# Patient Record
Sex: MALE | Race: ASIAN | NOT HISPANIC OR LATINO | Employment: FULL TIME | URBAN - METROPOLITAN AREA
[De-identification: names, ages, dates, MRNs, and addresses within clinical notes are randomized per-mention and may not be internally consistent; named-entity substitution may affect disease eponyms.]

---

## 2017-03-28 ENCOUNTER — TRANSFERRED RECORDS (OUTPATIENT)
Dept: MULTI SPECIALTY CLINIC | Facility: CLINIC | Age: 62
End: 2017-03-28

## 2023-12-07 ENCOUNTER — OFFICE VISIT (OUTPATIENT)
Dept: INTERNAL MEDICINE | Facility: CLINIC | Age: 68
End: 2023-12-07
Payer: COMMERCIAL

## 2023-12-07 VITALS
OXYGEN SATURATION: 95 % | TEMPERATURE: 96.8 F | HEART RATE: 97 BPM | WEIGHT: 211.8 LBS | SYSTOLIC BLOOD PRESSURE: 138 MMHG | DIASTOLIC BLOOD PRESSURE: 80 MMHG

## 2023-12-07 DIAGNOSIS — E11.29 TYPE 2 DIABETES MELLITUS WITH MICROALBUMINURIA, WITHOUT LONG-TERM CURRENT USE OF INSULIN (H): ICD-10-CM

## 2023-12-07 DIAGNOSIS — L83 ACANTHOSIS NIGRICANS: ICD-10-CM

## 2023-12-07 DIAGNOSIS — R80.9 TYPE 2 DIABETES MELLITUS WITH MICROALBUMINURIA, WITHOUT LONG-TERM CURRENT USE OF INSULIN (H): ICD-10-CM

## 2023-12-07 DIAGNOSIS — Z23 ENCOUNTER FOR IMMUNIZATION: ICD-10-CM

## 2023-12-07 DIAGNOSIS — I10 ESSENTIAL HYPERTENSION: ICD-10-CM

## 2023-12-07 DIAGNOSIS — E78.5 HYPERLIPIDEMIA LDL GOAL <100: ICD-10-CM

## 2023-12-07 DIAGNOSIS — M65.30 TRIGGER FINGER, ACQUIRED: ICD-10-CM

## 2023-12-07 DIAGNOSIS — Z00.00 ROUTINE GENERAL MEDICAL EXAMINATION AT A HEALTH CARE FACILITY: Primary | ICD-10-CM

## 2023-12-07 DIAGNOSIS — Z12.5 SCREENING FOR PROSTATE CANCER: ICD-10-CM

## 2023-12-07 DIAGNOSIS — M17.0 PRIMARY OSTEOARTHRITIS OF BOTH KNEES: ICD-10-CM

## 2023-12-07 PROBLEM — E87.6 CHRONIC HYPOKALEMIA: Status: ACTIVE | Noted: 2021-07-11

## 2023-12-07 PROBLEM — L20.82 FLEXURAL ECZEMA: Status: ACTIVE | Noted: 2021-07-11

## 2023-12-07 LAB
ALBUMIN SERPL BCG-MCNC: 4.5 G/DL (ref 3.5–5.2)
ALP SERPL-CCNC: 71 U/L (ref 40–150)
ALT SERPL W P-5'-P-CCNC: 38 U/L (ref 0–70)
ANION GAP SERPL CALCULATED.3IONS-SCNC: 9 MMOL/L (ref 7–15)
AST SERPL W P-5'-P-CCNC: 32 U/L (ref 0–45)
BILIRUB SERPL-MCNC: 0.9 MG/DL
BUN SERPL-MCNC: 14.4 MG/DL (ref 8–23)
CALCIUM SERPL-MCNC: 9.5 MG/DL (ref 8.8–10.2)
CHLORIDE SERPL-SCNC: 101 MMOL/L (ref 98–107)
CHOLEST SERPL-MCNC: 111 MG/DL
CREAT SERPL-MCNC: 0.91 MG/DL (ref 0.67–1.17)
CREAT UR-MCNC: 77.6 MG/DL
DEPRECATED HCO3 PLAS-SCNC: 31 MMOL/L (ref 22–29)
EGFRCR SERPLBLD CKD-EPI 2021: >90 ML/MIN/1.73M2
FASTING STATUS PATIENT QL REPORTED: YES
GLUCOSE SERPL-MCNC: 228 MG/DL (ref 70–99)
HBA1C MFR BLD: 8.5 % (ref 0–5.6)
HDLC SERPL-MCNC: 32 MG/DL
LDLC SERPL CALC-MCNC: 59 MG/DL
MICROALBUMIN UR-MCNC: 467 MG/L
MICROALBUMIN/CREAT UR: 601.8 MG/G CR (ref 0–17)
NONHDLC SERPL-MCNC: 79 MG/DL
POTASSIUM SERPL-SCNC: 3.5 MMOL/L (ref 3.4–5.3)
PROT SERPL-MCNC: 7 G/DL (ref 6.4–8.3)
PSA SERPL DL<=0.01 NG/ML-MCNC: 0.66 NG/ML (ref 0–4.5)
SODIUM SERPL-SCNC: 141 MMOL/L (ref 135–145)
TRIGL SERPL-MCNC: 98 MG/DL

## 2023-12-07 PROCEDURE — 83036 HEMOGLOBIN GLYCOSYLATED A1C: CPT | Performed by: INTERNAL MEDICINE

## 2023-12-07 PROCEDURE — 99214 OFFICE O/P EST MOD 30 MIN: CPT | Mod: 25 | Performed by: INTERNAL MEDICINE

## 2023-12-07 PROCEDURE — 91320 SARSCV2 VAC 30MCG TRS-SUC IM: CPT | Performed by: INTERNAL MEDICINE

## 2023-12-07 PROCEDURE — 90662 IIV NO PRSV INCREASED AG IM: CPT | Performed by: INTERNAL MEDICINE

## 2023-12-07 PROCEDURE — 82043 UR ALBUMIN QUANTITATIVE: CPT | Performed by: INTERNAL MEDICINE

## 2023-12-07 PROCEDURE — 90471 IMMUNIZATION ADMIN: CPT | Performed by: INTERNAL MEDICINE

## 2023-12-07 PROCEDURE — 80061 LIPID PANEL: CPT | Performed by: INTERNAL MEDICINE

## 2023-12-07 PROCEDURE — 80053 COMPREHEN METABOLIC PANEL: CPT | Performed by: INTERNAL MEDICINE

## 2023-12-07 PROCEDURE — 90480 ADMN SARSCOV2 VAC 1/ONLY CMP: CPT | Performed by: INTERNAL MEDICINE

## 2023-12-07 PROCEDURE — G0103 PSA SCREENING: HCPCS | Performed by: INTERNAL MEDICINE

## 2023-12-07 PROCEDURE — 82570 ASSAY OF URINE CREATININE: CPT | Performed by: INTERNAL MEDICINE

## 2023-12-07 PROCEDURE — 99207 PR FOOT EXAM NO CHARGE: CPT | Performed by: INTERNAL MEDICINE

## 2023-12-07 PROCEDURE — 99387 INIT PM E/M NEW PAT 65+ YRS: CPT | Mod: 25 | Performed by: INTERNAL MEDICINE

## 2023-12-07 PROCEDURE — 36415 COLL VENOUS BLD VENIPUNCTURE: CPT | Performed by: INTERNAL MEDICINE

## 2023-12-07 RX ORDER — GLIPIZIDE 10 MG/1
10 TABLET, FILM COATED, EXTENDED RELEASE ORAL DAILY
COMMUNITY
Start: 2022-12-09 | End: 2023-12-07

## 2023-12-07 RX ORDER — LATANOPROST 50 UG/ML
SOLUTION/ DROPS OPHTHALMIC
COMMUNITY
Start: 2022-11-07

## 2023-12-07 RX ORDER — HYDROCHLOROTHIAZIDE 25 MG/1
1 TABLET ORAL DAILY
COMMUNITY
Start: 2023-07-25 | End: 2023-12-07

## 2023-12-07 RX ORDER — GABAPENTIN 300 MG/1
300 CAPSULE ORAL 3 TIMES DAILY
COMMUNITY
Start: 2022-09-29 | End: 2023-12-07

## 2023-12-07 RX ORDER — ATORVASTATIN CALCIUM 20 MG/1
20 TABLET, FILM COATED ORAL DAILY
COMMUNITY
Start: 2023-08-15 | End: 2023-12-07

## 2023-12-07 RX ORDER — BLOOD SUGAR DIAGNOSTIC
1 STRIP MISCELLANEOUS 3 TIMES DAILY
COMMUNITY
Start: 2022-08-08

## 2023-12-07 RX ORDER — METFORMIN HCL 500 MG
2000 TABLET, EXTENDED RELEASE 24 HR ORAL
COMMUNITY
Start: 2023-02-03 | End: 2023-12-07

## 2023-12-07 RX ORDER — AMLODIPINE BESYLATE 5 MG/1
1 TABLET ORAL DAILY
COMMUNITY
Start: 2023-07-19 | End: 2023-12-07

## 2023-12-07 RX ORDER — IRBESARTAN 300 MG/1
300 TABLET ORAL DAILY
Qty: 90 TABLET | Refills: 4 | Status: SHIPPED | OUTPATIENT
Start: 2023-12-07

## 2023-12-07 RX ORDER — METFORMIN HCL 500 MG
2000 TABLET, EXTENDED RELEASE 24 HR ORAL
Qty: 360 TABLET | Refills: 4 | Status: SHIPPED | OUTPATIENT
Start: 2023-12-07

## 2023-12-07 RX ORDER — ASPIRIN 81 MG/1
1 TABLET ORAL DAILY
COMMUNITY
Start: 2022-08-26

## 2023-12-07 RX ORDER — IRBESARTAN 300 MG/1
1 TABLET ORAL DAILY
COMMUNITY
Start: 2023-07-25 | End: 2023-12-07

## 2023-12-07 RX ORDER — AMLODIPINE BESYLATE 5 MG/1
5 TABLET ORAL DAILY
Qty: 90 TABLET | Refills: 4 | Status: SHIPPED | OUTPATIENT
Start: 2023-12-07

## 2023-12-07 RX ORDER — HYDROCHLOROTHIAZIDE 25 MG/1
25 TABLET ORAL DAILY
Qty: 90 TABLET | Refills: 4 | Status: SHIPPED | OUTPATIENT
Start: 2023-12-07

## 2023-12-07 RX ORDER — GABAPENTIN 300 MG/1
300 CAPSULE ORAL 3 TIMES DAILY
Qty: 270 CAPSULE | Refills: 4 | Status: SHIPPED | OUTPATIENT
Start: 2023-12-07

## 2023-12-07 RX ORDER — GLIPIZIDE 10 MG/1
10 TABLET, FILM COATED, EXTENDED RELEASE ORAL DAILY
Qty: 90 TABLET | Refills: 4 | Status: SHIPPED | OUTPATIENT
Start: 2023-12-07

## 2023-12-07 RX ORDER — ATORVASTATIN CALCIUM 20 MG/1
20 TABLET, FILM COATED ORAL DAILY
Qty: 90 TABLET | Refills: 4 | Status: SHIPPED | OUTPATIENT
Start: 2023-12-07

## 2023-12-07 ASSESSMENT — ENCOUNTER SYMPTOMS
CONSTIPATION: 0
COUGH: 1
CHILLS: 0
HEMATOCHEZIA: 0
HEMATURIA: 0
ABDOMINAL PAIN: 0

## 2023-12-07 ASSESSMENT — ACTIVITIES OF DAILY LIVING (ADL): CURRENT_FUNCTION: NO ASSISTANCE NEEDED

## 2023-12-07 NOTE — LETTER
[address] [address]  Phone: [phone]   12/11/2023      Darrel Bustos  [address] [facility] [address]        Dear Mr. Darrel Bustos:    I am writing to inform you of the results of the laboratory tests you had done recently. Your PSA (prostate cancer screening test) is normal. Your hemoglobin A1c is 8.5%, indicating your diabetes is not as well controlled as we would like. Additionally, your urine shows signs of protein in it. This is seen when kidneys are being damaged by diabetes. It will be important to get your diabetes under better control. Weight loss can be very beneficial at this stage in order to help prevent the progression of your diabetes and I encourage you to work on losing weight through dietary changes such as smaller portions, cutting out excessive sweets, etc.    Please follow-up with me in 3 months.    If you have any further questions or problems, please contact our nurse line at [phone]. An even easier way to get ahold of our team is through [employer], which you can sign up for at [email or web address]. MyChart is not only a great way to communicate with us, but also allows you to see your full results.        Sincerely,        Wilbert Sanchez MD, MPH  Department of Internal Medicine  [facility]

## 2023-12-07 NOTE — PATIENT INSTRUCTIONS
- Our team will contact you via [employer] (if you sign up for it), telephone call (if results are urgent), or otherwise via letter in the mail with the results of today's lab tests once I have a chance to review them    Today we discussed your hypertension (high blood pressure). Goal blood pressure is an average of less than 130/80. Four important things to remember about your hypertension:    1) Take all medications as prescribed! Today we discussed your blood pressure medications and decided to continue them. No changes were made.    2) Lose weight! Losing weight is the best thing you can do to lower your blood pressure. Studies have found that for every 2 pounds you lose, your blood pressure will go down by 1 point. Ex: if you lose 10 pounds, your blood pressure should go down by 5 points. That's better than any medication, plus it will impact your health in a number of other positive ways. This may be a good time to make a weight loss goal.    3) Lower your sodium intake! Eating too much salt causes your body to hold onto extra water, which makes your blood pressure higher. Ditching the salt shaker is a good thing, but most of our sodium intake actually comes from pre-packaged foods. Read labels on cans and food packages. The labels tell you how much sodium is in each serving. Make sure that you look at the serving size. If you eat more than the serving size, you have eaten more sodium. Decreasing your sodium intake by more than 1,000mg per day can lower your blood pressure by up to 5 points and can be as effective as starting a blood pressure medication.    4) Check your blood pressure at home! You can buy a home monitor in a drugstore, supermarket pharmacy, or other large store. Not all automated blood pressure machines are created equal. You can find a list of validated blood pressure cuffs (meaning they have been confirmed to give accurate readings) by going to www.validatebp.org. That website does not sell  blood pressure cuffs, but rather it lists the exact models that have been validated to be accurate. Wrist blood pressure cuffs do not provide reliable comparisons to upper arm (brachial) cuffs. Be sure the arm cuff is the right size for your arm. Ask someone to measure around your upper arm. If your upper arm is more than 13 inches around, buy a monitor with a large cuff. To get a correct measurement, the cuff needs to be the right size.    It is important to measure your blood pressure periodically at home in between office visits. The readings you obtain during these blood pressure checks are often more valuable than the readings we obtain in clinic. However, it is even more important to check your blood pressure CORRECTLY at home. Follow these tips.    Check your blood pressure in the early morning (before you eat, drink, or take any medicines) and at one other random time of day. The random time of day does not need to be consistent from day to day.  Put the cuff on your arm. Remove clothes that get in the way of the cuff. Don t roll up your sleeve in a way that s tight around your arm. The cord should go toward your hand. Line it up with the middle of your forearm. The Velcro should attach easily on the cuff. If it doesn t reach, you may need a bigger cuff.  Wait 30 minutes if you have just eaten a lot, had a drink with caffeine or alcohol, used tobacco products, or exercised. Use the restroom if you need to. (Needing to go can raise your BP.)  Rest both feet flat on the floor with your back supported. Rest your arm at heart level on a table or the arm of a chair.  Sit quietly for 5 minutes or more before taking your blood pressure. Avoid talking while your blood pressure is being measured.  Start the monitor. Press the button or squeeze the ball to measure your blood pressure. Write down the time, the measurement, and your pulse.  Wait 2 minutes.  Repeat 2 more times.  Take the average of the readings. That's  your blood pressure.    Lastly, bring your home monitor into the office for us to validate! Compare your blood pressure monitor to the standardized method we use. It's a good idea to do this once per machine or if your machine starts giving you odd readings (suddenly much higher or lower than you're used to).

## 2023-12-07 NOTE — PROGRESS NOTES
"SUBJECTIVE:   Darrel is a 68 year old presenting for the following:Physical    Healthy Habits:     In general, how would you rate your overall health?  Fair    Frequency of exercise:  4-5 days/week    Duration of exercise:  45-60 minutes    Do you usually eat at least 4 servings of fruit and vegetables a day, include whole grains    & fiber and avoid regularly eating high fat or \"junk\" foods?  Yes    Taking medications regularly:  Yes    Medication side effects:  None, No muscle aches and No significant flushing    Ability to successfully perform activities of daily living:  No assistance needed    Home Safety:  No safety concerns identified    Hearing Impairment:  Need to ask people to speak up or repeat themselves    In the past 6 months, have you been bothered by leaking of urine? Yes    In general, how would you rate your overall mental or emotional health?  Fair    Additional concerns today:  Yes    Darrel presents today for a physical exam. This is the first time I have met Darrel, who tells me his insurance recently changed and he's switching to [facility]. We also discussed a lump on his hand and a rash on his thighs. Ketoconazole helps with rash a bit but it is spreading. Mildly itchy.    Mental Health:  In general, how would you rate your overall mental or emotional health? fair    Today's PHQ-2 Score:       12/7/2023     9:49 AM   PHQ-2 ( 1999 [employer])   Q1: Little interest or pleasure in doing things 1   Q2: Feeling down, depressed or hopeless 1   PHQ-2 Score 2   Q1: Little interest or pleasure in doing things Several days   Q2: Feeling down, depressed or hopeless Several days   PHQ-2 Score 2     Do you feel safe in your environment? Yes    Have you ever done Advance Care Planning? (For example, a Health Directive, POLST, or a discussion with a medical provider or your loved ones about your wishes)? No, advance care planning information given to patient to review.  Patient plans to discuss their wishes with loved " ones or provider.      Fall risk:  Fallen 2 or more times in the past year?: No  Any fall with injury in the past year?: No    Cognitive Screenin) Repeat 3 items (Leader, Season, Table)    2) Clock draw: NORMAL  3) 3 item recall: Recalls 2 objects   Results: NORMAL clock, 1-2 items recalled: COGNITIVE IMPAIRMENT LESS LIKELY    Mini-CogTM Copyright URIEL Blackwood. Licensed by the author for use in [facility]; reprinted with permission (chelsea@[facility]). All rights reserved.      Do you have sleep apnea, excessive snoring or daytime drowsiness? : no    Social History     Tobacco Use    Smoking status: Never    Smokeless tobacco: Never   Substance Use Topics    Alcohol use: Not on file         2023     9:48 AM   Alcohol Use   Prescreen: >3 drinks/day or >7 drinks/week? Not Applicable     Do you have a current opioid prescription? No  Do you use any other controlled substances or medications that are not prescribed by a provider? None    Current providers sharing in care for this patient include:   No care team member to display    The following health maintenance items are reviewed in Epic and correct as of today:  Health Maintenance   Topic Date Due    ANNUAL REVIEW OF HM ORDERS  Never done    COLORECTAL CANCER SCREENING  Never done    LIPID  Never done    RSV VACCINE (Pregnancy & 60+) (1 - 1-dose 60+ series) Never done    INFLUENZA VACCINE (1) 2023    COVID-19 Vaccine ( - - season) 2023    DTAP/TDAP/TD IMMUNIZATION (2 - Td or Tdap) 2024    MEDICARE ANNUAL WELLNESS VISIT  2024    FALL RISK ASSESSMENT  2024    Pneumococcal Vaccine: 65+ Years (3 - PPSV23 or PCV20) 2025    ADVANCE CARE PLANNING  2028    HEPATITIS C SCREENING  Completed    PHQ-2 (once per calendar year)  Completed    ZOSTER IMMUNIZATION  Completed    AORTIC ANEURYSM SCREENING (SYSTEM ASSIGNED)  Completed    IPV IMMUNIZATION  Aged Out    HPV IMMUNIZATION  Aged Out    MENINGITIS IMMUNIZATION   Aged Out    RSV MONOCLONAL ANTIBODY  Aged Out     Have you ever done Advance Care Planning? (For example, a Health Directive, POLST, or a discussion with a medical provider or your loved ones about your wishes): No    Social History     Tobacco Use    Smoking status: Never    Smokeless tobacco: Never   Substance Use Topics    Alcohol use: Not on file         12/7/2023     9:48 AM   Alcohol Use   Prescreen: >3 drinks/day or >7 drinks/week? Not Applicable     Reviewed orders with patient. Reviewed health maintenance and updated orders accordingly - Yes    Reviewed and updated as needed this visit by clinical staff   Tobacco  Allergies  Meds  Problems  Med Hx  Surg Hx  Fam Hx        Reviewed and updated as needed this visit by Provider   Tobacco  Allergies  Meds  Problems  Med Hx  Surg Hx  Fam Hx         Review of Systems   Constitutional:  Negative for chills.   HENT:  Negative for congestion.    Respiratory:  Positive for cough.    Cardiovascular:  Positive for chest pain.   Gastrointestinal:  Negative for abdominal pain, constipation and hematochezia.   Genitourinary:  Negative for hematuria.     OBJECTIVE:   BP (!) 148/90   Pulse 97   Temp 96.8  F (36  C) (Temporal)   Wt 96.1 kg (211 lb 12.8 oz)   SpO2 95%     Physical Exam  GENERAL: alert and in no distress.  EYES: conjunctivae/corneas clear. EOMs grossly intact  HENT: Facies symmetric.  RESP: CTAB, no w/r/r.  CV: RRR, no m/r/g.  GI: NT, ND, without rebound tenderness or guarding  MSK: No edema. Moves all four extremities freely. Palpable lump in palm.  FOOT EXAM: No ulcers/sores. DP pulse 2+. Sensation to monofilament intact.  SKIN: Brownish discoloraton with skin thickening in folds of groin. No other significant ulcers, lesions, or rashes on the visualized portions of the skin  NEURO: CN II-XII grossly intact.    Diagnostic Test Results: Labs reviewed in Epic    ASSESSMENT/PLAN:   Routine general medical examination at a [facility]  facility  Reviewed PMH. Discussed healthcare maintenance issues, including cancer screenings, relevant immunizations, and cardiac risk factor screenings such as for cholesterol, HTN, and DM.    Trigger finger, acquired  Referral to hand ortho placed.  - Orthopedic  Referral; Future    Primary osteoarthritis of both knees  Previously received cortisone shots in both knees, would like to discuss that or next steps. Referral to knee ortho placed.  - Orthopedic  Referral; Future    Acanthosis nigricans  Patient education handout regarding this condition printed off and given to patient. He's wondering if it may be something else. Derm referral placed for consult.  - Adult Dermatology  Referral; Future    Essential hypertension  Refilled chronic medications at current dose. Will check labs today. Encouraged to start checking BP at home and to let me know via MyChart or f/u visit what those readings are in case we need to further titrate medication to obtain optimal BP control.   - Comprehensive metabolic panel; Future  - amLODIPine (NORVASC) 5 MG tablet; Take 1 tablet (5 mg) by mouth daily  - hydrochlorothiazide (HYDRODIURIL) 25 MG tablet; Take 1 tablet (25 mg) by mouth daily  - irbesartan (AVAPRO) 300 MG tablet; Take 1 tablet (300 mg) by mouth daily    Type 2 diabetes mellitus with microalbuminuria, without long-term current use of insulin (H)  Overdue for labs. Foot exam WNL today. Refilled current meds.  - Hemoglobin A1c; Future  - Albumin Random Urine Quantitative with Creat Ratio; Future  - FOOT EXAM  - gabapentin (NEURONTIN) 300 MG capsule; Take 1 capsule (300 mg) by mouth 3 times daily  - glipiZIDE (GLUCOTROL XL) 10 MG 24 hr tablet; Take 1 tablet (10 mg) by mouth daily  - metFORMIN (GLUCOPHAGE XR) 500 MG 24 hr tablet; Take 4 tablets (2,000 mg) by mouth daily (with breakfast)  - PRIMARY CARE FOLLOW-UP SCHEDULING; Future    Hyperlipidemia LDL goal <100  Fasting labs today. Refilled chronic  medication at current dose.  - Lipid panel reflex to direct LDL Fasting; Future  - atorvastatin (LIPITOR) 20 MG tablet; Take 1 tablet (20 mg) by mouth daily    Screening for prostate cancer  PSA WNL in past.  - Prostate Specific Antigen Screen; Future    Encounter for immunization  - INFLUENZA VACCINE 65+ (FLUZONE HD)  - COVID-19 12+ (2023-24) (PFIZER)    COUNSELING:   Reviewed preventive health counseling, as reflected in patient instructions    He reports that he has never smoked. He has never used smokeless tobacco.    Wilbert Sanchez MD  [facility]

## 2023-12-07 NOTE — Clinical Note
3/28/2017 colonoscopy by Milad Escobar MD. Polypectomy (benign hyperplastic polyp). 10 year follow-up (3/28/2027).

## 2023-12-13 ENCOUNTER — ANCILLARY PROCEDURE (OUTPATIENT)
Dept: GENERAL RADIOLOGY | Facility: CLINIC | Age: 68
End: 2023-12-13
Attending: FAMILY MEDICINE
Payer: COMMERCIAL

## 2023-12-13 ENCOUNTER — OFFICE VISIT (OUTPATIENT)
Dept: ORTHOPEDICS | Facility: CLINIC | Age: 68
End: 2023-12-13
Attending: INTERNAL MEDICINE
Payer: COMMERCIAL

## 2023-12-13 VITALS
HEIGHT: 67 IN | WEIGHT: 210 LBS | SYSTOLIC BLOOD PRESSURE: 131 MMHG | HEART RATE: 87 BPM | DIASTOLIC BLOOD PRESSURE: 79 MMHG | BODY MASS INDEX: 32.96 KG/M2

## 2023-12-13 DIAGNOSIS — M19.042 LOCALIZED OSTEOARTHRITIS OF HAND, LEFT: ICD-10-CM

## 2023-12-13 DIAGNOSIS — M67.442 GANGLION CYST OF FLEXOR TENDON SHEATH OF FINGER OF LEFT HAND: Primary | ICD-10-CM

## 2023-12-13 DIAGNOSIS — M65.30 TRIGGER FINGER, ACQUIRED: ICD-10-CM

## 2023-12-13 PROCEDURE — 99204 OFFICE O/P NEW MOD 45 MIN: CPT | Performed by: FAMILY MEDICINE

## 2023-12-13 PROCEDURE — 73130 X-RAY EXAM OF HAND: CPT | Mod: LT | Performed by: FAMILY MEDICINE

## 2023-12-13 NOTE — PATIENT INSTRUCTIONS
1. Ganglion cyst of flexor tendon sheath of finger of left hand    2. Localized osteoarthritis of hand, left      -Patient has soft tissue swelling along the flexor tendon of the fourth digit in the left hand due to a ganglion cyst.  Patient also has some deformity and stiffness at the base of the thumb and at the IP joint due to arthritis  -Patient reports very minimal symptoms at this time.  -If cyst grows and becomes more painful and if his arthritis worsens, he may apply ice and take over-the-counter anti-inflammatory medications as needed.  -If pain persist, he may follow-up for further evaluation and treatment  -Patient may continue with all activity as his pain allows  -Call direct clinic number [[phone]] at any time with questions or concerns.    Albert Yeo MD [facility]  [facility]

## 2023-12-13 NOTE — LETTER
12/13/2023         RE: Darrel Bustos  [address] [facility] [address]        Dear Colleague,    Thank you for referring your patient, Darrel Bustos, to the [facility] [place]. Please see a copy of my visit note below.    ASSESSMENT & PLAN  Patient Instructions     1. Ganglion cyst of flexor tendon sheath of finger of left hand    2. Localized osteoarthritis of hand, left      -Patient has soft tissue swelling along the flexor tendon of the fourth digit in the left hand due to a ganglion cyst.  Patient also has some deformity and stiffness at the base of the thumb and at the IP joint due to arthritis  -Patient reports very minimal symptoms at this time.  -If cyst grows and becomes more painful and if his arthritis worsens, he may apply ice and take over-the-counter anti-inflammatory medications as needed.  -If pain persist, he may follow-up for further evaluation and treatment  -Patient may continue with all activity as his pain allows  -Call direct clinic number [[phone]] at any time with questions or concerns.    Albert Yeo MD [facility]  [facility] [place]        -----    SUBJECTIVE  Darrel Bustos is a/an 68 year old Left handed male who is seen in consultation at the request of  Wilbert Meadows M.D. for evaluation of left hand pain. The patient is seen by themselves.    Onset: 4 month(s) ago. Reports insidious onset without acute precipitating event.  Location of Pain: left 4th finger, some pain on dorsal side of hand  Rating of Pain at worst: 1/10  Rating of Pain Currently: 0/10  Worsened by: gripping a computer mouse,   Better with: nothing yet  Treatments tried: Chinese rubbing alcohol  Associated symptoms: no distal numbness or tingling; denies swelling or warmth and locking or catching of L4 finger, dorsal hand: painful with palpation, visible bruising, L4 finger: visible tendon bump on volar side of  hand.  Orthopedic history: NO  Relevant surgical history: NO  Social history: works from home as a , plays ping pong 2x per week    No past medical history on file.  Social History     Socioeconomic History     Marital status:    Tobacco Use     Smoking status: Never     Smokeless tobacco: Never   Vaping Use     Vaping Use: Never used     Social Determinants of Health     Financial Resource Strain: Low Risk  (12/7/2023)    Financial Resource Strain      Within the past 12 months, have you or your family members you live with been unable to get utilities (heat, electricity) when it was really needed?: No   Food Insecurity: Low Risk  (12/7/2023)    Food Insecurity      Within the past 12 months, did you worry that your food would run out before you got money to buy more?: No      Within the past 12 months, did the food you bought just not last and you didn t have money to get more?: No   Transportation Needs: Low Risk  (12/7/2023)    Transportation Needs      Within the past 12 months, has lack of transportation kept you from medical appointments, getting your medicines, non-medical meetings or appointments, work, or from getting things that you need?: No   Interpersonal Safety: Low Risk  (12/7/2023)    Interpersonal Safety      Do you feel physically and emotionally safe where you currently live?: Yes      Within the past 12 months, have you been hit, slapped, kicked or otherwise physically hurt by someone?: No      Within the past 12 months, have you been humiliated or emotionally abused in other ways by your partner or ex-partner?: No   Housing Stability: Low Risk  (12/7/2023)    Housing Stability      Do you have housing? : Yes      Are you worried about losing your housing?: No         Patient's past medical, surgical, social, and family histories were reviewed today and no changes are noted.    REVIEW OF SYSTEMS:  10 point ROS is negative other than symptoms noted above in HPI, Past  "Medical History or as stated below  Constitutional: NEGATIVE for fever, chills, change in weight  Skin: NEGATIVE for worrisome rashes, moles or lesions  GI/: NEGATIVE for bowel or bladder changes  Neuro: NEGATIVE for weakness, dizziness or paresthesias    OBJECTIVE:  /79   Pulse 87   Ht 1.702 m (5' 7\")   Wt 95.3 kg (210 lb)   BMI 32.89 kg/m     General: healthy, alert and in no distress  HEENT: no scleral icterus or conjunctival erythema  Skin: no suspicious lesions or rash. No jaundice.  CV: regular rhythm by palpation  Resp: normal respiratory effort without conversational dyspnea   Psych: normal mood and affect  Gait: normal steady gait with appropriate coordination and balance  Neuro: normal light touch sensory exam of the bilateral hands.    MSK:  LEFT HAND  Inspection:  Bony deformity over the dorsal aspect of the IP joint of the thumb and mild soft tissue swelling along the fourth flexor tendon  Palpation:   Carpals: normal   Metacarpals: normal   Thumb: PIP joint, CMC   Fingers: normal  Range of Motion:    Full active flexion and extension at MCP, PIP, and DIP joints; normal finger cascade without malrotation.  Wrist pronation, supination, and ulnar/radial deviation normal.  Strength:  Grossly intact  Special Tests:    Positive: none    Negative: flexor digitorum superficialis testing, flexor digitorum profundus testing    Independent visualization of the below image:  Recent Results (from the past 24 hour(s))   XR Hand Left G/E 3 Views    Narrative    Joint space narrowing and osteophytes seen at the first CMC, IP joint of   the thumb and DIP joint of the second and third digits.  No acute fracture   dislocation             Albert Yeo MD [facility]      Again, thank you for allowing me to participate in the care of your patient.        Sincerely,        Albert Yeo, MD  "

## 2023-12-13 NOTE — PROGRESS NOTES
ASSESSMENT & PLAN  Patient Instructions     1. Ganglion cyst of flexor tendon sheath of finger of left hand    2. Localized osteoarthritis of hand, left      -Patient has soft tissue swelling along the flexor tendon of the fourth digit in the left hand due to a ganglion cyst.  Patient also has some deformity and stiffness at the base of the thumb and at the IP joint due to arthritis  -Patient reports very minimal symptoms at this time.  -If cyst grows and becomes more painful and if his arthritis worsens, he may apply ice and take over-the-counter anti-inflammatory medications as needed.  -If pain persist, he may follow-up for further evaluation and treatment  -Patient may continue with all activity as his pain allows  -Call direct clinic number [[phone]] at any time with questions or concerns.    Albert Yeo MD [facility]  [facility]        -----    SUBJECTIVE  Darrel Bustos is a/an 68 year old Left handed male who is seen in consultation at the request of  Wilbert Meadows M.D. for evaluation of left hand pain. The patient is seen by themselves.    Onset: 4 month(s) ago. Reports insidious onset without acute precipitating event.  Location of Pain: left 4th finger, some pain on dorsal side of hand  Rating of Pain at worst: 1/10  Rating of Pain Currently: 0/10  Worsened by: gripping a computer mouse,   Better with: nothing yet  Treatments tried: Chinese rubbing alcohol  Associated symptoms: no distal numbness or tingling; denies swelling or warmth and locking or catching of L4 finger, dorsal hand: painful with palpation, visible bruising, L4 finger: visible tendon bump on volar side of hand.  Orthopedic history: NO  Relevant surgical history: NO  Social history: works from home as a , plays [employer] 2x per week    No past medical history on file.  Social History     Socioeconomic History    Marital status:    Tobacco Use     Smoking status: Never    Smokeless tobacco: Never   Vaping Use    Vaping Use: Never used     Social Determinants of Health     Financial Resource Strain: Low Risk  (12/7/2023)    Financial Resource Strain     Within the past 12 months, have you or your family members you live with been unable to get utilities (heat, electricity) when it was really needed?: No   Food Insecurity: Low Risk  (12/7/2023)    Food Insecurity     Within the past 12 months, did you worry that your food would run out before you got money to buy more?: No     Within the past 12 months, did the food you bought just not last and you didn t have money to get more?: No   Transportation Needs: Low Risk  (12/7/2023)    Transportation Needs     Within the past 12 months, has lack of transportation kept you from medical appointments, getting your medicines, non-medical meetings or appointments, work, or from getting things that you need?: No   Interpersonal Safety: Low Risk  (12/7/2023)    Interpersonal Safety     Do you feel physically and emotionally safe where you currently live?: Yes     Within the past 12 months, have you been hit, slapped, kicked or otherwise physically hurt by someone?: No     Within the past 12 months, have you been humiliated or emotionally abused in other ways by your partner or ex-partner?: No   Housing Stability: Low Risk  (12/7/2023)    Housing Stability     Do you have housing? : Yes     Are you worried about losing your housing?: No         Patient's past medical, surgical, social, and family histories were reviewed today and no changes are noted.    REVIEW OF SYSTEMS:  10 point ROS is negative other than symptoms noted above in HPI, Past Medical History or as stated below  Constitutional: NEGATIVE for fever, chills, change in weight  Skin: NEGATIVE for worrisome rashes, moles or lesions  GI/: NEGATIVE for bowel or bladder changes  Neuro: NEGATIVE for weakness, dizziness or paresthesias    OBJECTIVE:  /79    "Pulse 87   Ht 1.702 m (5' 7\")   Wt 95.3 kg (210 lb)   BMI 32.89 kg/m     General: healthy, alert and in no distress  HEENT: no scleral icterus or conjunctival erythema  Skin: no suspicious lesions or rash. No jaundice.  CV: regular rhythm by palpation  Resp: normal respiratory effort without conversational dyspnea   Psych: normal mood and affect  Gait: normal steady gait with appropriate coordination and balance  Neuro: normal light touch sensory exam of the bilateral hands.    MSK:  LEFT HAND  Inspection:  Bony deformity over the dorsal aspect of the IP joint of the thumb and mild soft tissue swelling along the fourth flexor tendon  Palpation:   Carpals: normal   Metacarpals: normal   Thumb: PIP joint, CMC   Fingers: normal  Range of Motion:    Full active flexion and extension at MCP, PIP, and DIP joints; normal finger cascade without malrotation.  Wrist pronation, supination, and ulnar/radial deviation normal.  Strength:  Grossly intact  Special Tests:    Positive: none    Negative: flexor digitorum superficialis testing, flexor digitorum profundus testing    Independent visualization of the below image:  Recent Results (from the past 24 hour(s))   XR Hand Left G/E 3 Views    Narrative    Joint space narrowing and osteophytes seen at the first CMC, IP joint of   the thumb and DIP joint of the second and third digits.  No acute fracture   dislocation             Albert Yeo MD [facility]    "

## 2023-12-14 ENCOUNTER — OFFICE VISIT (OUTPATIENT)
Dept: DERMATOLOGY | Facility: CLINIC | Age: 68
End: 2023-12-14
Attending: INTERNAL MEDICINE
Payer: COMMERCIAL

## 2023-12-14 DIAGNOSIS — L83 ACANTHOSIS NIGRICANS: ICD-10-CM

## 2023-12-14 DIAGNOSIS — L24.9 IRRITANT DERMATITIS: Primary | ICD-10-CM

## 2023-12-14 PROCEDURE — 99204 OFFICE O/P NEW MOD 45 MIN: CPT | Mod: GC | Performed by: DERMATOLOGY

## 2023-12-14 RX ORDER — HYDROCORTISONE 2.5 %
CREAM (GRAM) TOPICAL 2 TIMES DAILY
Qty: 30 G | Refills: 4 | Status: SHIPPED | OUTPATIENT
Start: 2023-12-14

## 2023-12-14 RX ORDER — KETOCONAZOLE 20 MG/G
CREAM TOPICAL DAILY
COMMUNITY

## 2023-12-14 ASSESSMENT — PAIN SCALES - GENERAL: PAINLEVEL: NO PAIN (0)

## 2023-12-14 NOTE — NURSING NOTE
Dermatology Rooming Note    Darrel Bustos's goals for this visit include:   Chief Complaint   Patient presents with    Derm Problem     Darrel is here today for ring worm on the medial thighs with scrotum and penis itching      Vane MIKE CMA

## 2023-12-14 NOTE — PROGRESS NOTES
[facility] Dermatology Note  Encounter Date: Dec 14, 2023  Office Visit     Dermatology Problem List:  1. Irritant Dermatitis, groin  Current Tx: 2.5% hydrocort cream BID x 2 weeks then PRN; miconazole powder PRN    ____________________________________________    Assessment & Plan:     # Eczematous Dermatitis, favor irritant.  Ill defined hyperpigmented eczematous plaques in inguinal folds of groin, medial thigh. Most consistent with an irritant dermatitis. No scale or rolled borders c/f tinea. Discussed etiology and treatment. Giving topical steroid to treat inflammation and giving miconazole prn to aid in drying.  -gentle skin care reviewed   -hydrocortisone 2.5% cream BID x 2 weeks then PRN  -Miconazole powder daily PRN     Procedures Performed:   none    Follow-up: prn for new or changing lesions    Staff and Resident:     Cong Harrington MD  Internal Medicine-Dermatology PGY-2    Staff: Cammie    ____________________________________________    CC: Derm Problem (Darrel is here today for ring worm on the medial thighs with scrotum and penis itching )    HPI:  Mr. Darrel Bustos is a(n) 68 year old male who presents today as a new patient for groin rash.    He states he was diagnosed with ringworm in the groin 10 years ago. He was given ketoconazole cream and this helped. Since then, he has intermittently been getting a recurrent itchy rash. He has used the ketoconazole cream but it has not been helping. He does have a history of eczema in the ears. Plays ping pong. Gets sweaty. Thinks this is contributing.     Patient is otherwise feeling well, without additional skin concerns.    Labs Reviewed:  N/A    Physical Exam:  Vitals: There were no vitals taken for this visit.  SKIN: Total skin excluding the undergarment areas was performed. The exam included the head/face, neck, both arms, chest, back, abdomen, both legs, digits and/or nails.   - ill defined hyperpigmented plaques with some faint erythema  in inguinal groin and right medial thigh  -no dystrophic nails, fissuring, or scale in feet.  - No other lesions of concern on areas examined.     Medications:  Current Outpatient Medications   Medication    amLODIPine (NORVASC) 5 MG tablet    aspirin 81 MG EC tablet    atorvastatin (LIPITOR) 20 MG tablet    blood glucose (ACCU-CHEK GUIDE) test strip    gabapentin (NEURONTIN) 300 MG capsule    glipiZIDE (GLUCOTROL XL) 10 MG 24 hr tablet    hydrochlorothiazide (HYDRODIURIL) 25 MG tablet    irbesartan (AVAPRO) 300 MG tablet    ketoconazole (NIZORAL) 2 % external cream    latanoprost (XALATAN) 0.005 % ophthalmic solution    metFORMIN (GLUCOPHAGE XR) 500 MG 24 hr tablet     No current facility-administered medications for this visit.      Past Medical History:   Patient Active Problem List   Diagnosis    Type 2 diabetes mellitus with microalbuminuria, without long-term current use of insulin (H)    Sensorineural hearing loss (SNHL) of both ears    Primary osteoarthritis involving multiple joints    Flexural eczema    Essential hypertension    Hyperlipidemia LDL goal <100     No past medical history on file.    CC Wilbert Meadows MD  [address] [place] [address] on close of this encounter.

## 2023-12-14 NOTE — LETTER
12/14/2023       RE: Darrel Bustos  [address] [id number]     Dear Colleague,    Thank you for referring your patient, Darrel Bustos, to the [facility] [place] at [facility]. Please see a copy of my visit note below.    [facility] Dermatology Note  Encounter Date: Dec 14, 2023  Office Visit     Dermatology Problem List:  1. Irritant Dermatitis, groin  Current Tx: 2.5% hydrocort cream BID x 2 weeks then PRN; miconazole powder PRN    ____________________________________________    Assessment & Plan:     # Eczematous Dermatitis, favor irritant.  Ill defined hyperpigmented eczematous plaques in inguinal folds of groin, medial thigh. Most consistent with an irritant dermatitis. No scale or rolled borders c/f tinea. Discussed etiology and treatment. Giving topical steroid to treat inflammation and giving miconazole prn to aid in drying.  -gentle skin care reviewed   -hydrocortisone 2.5% cream BID x 2 weeks then PRN  -Miconazole powder daily PRN     Procedures Performed:   none    Follow-up: prn for new or changing lesions    Staff and Resident:     Cong Harrington MD  Internal Medicine-Dermatology PGY-2    Staff: Cammie    ____________________________________________    CC: Derm Problem (Darrel is here today for ring worm on the medial thighs with scrotum and penis itching )    HPI:  Mr. Darrel Bustos is a(n) 68 year old male who presents today as a new patient for groin rash.    He states he was diagnosed with ringworm in the groin 10 years ago. He was given ketoconazole cream and this helped. Since then, he has intermittently been getting a recurrent itchy rash. He has used the ketoconazole cream but it has not been helping. He does have a history of eczema in the ears. Plays ping pong. Gets sweaty. Thinks this is contributing.     Patient is otherwise feeling well, without additional skin concerns.    Labs  Reviewed:  N/A    Physical Exam:  Vitals: There were no vitals taken for this visit.  SKIN: Total skin excluding the undergarment areas was performed. The exam included the head/face, neck, both arms, chest, back, abdomen, both legs, digits and/or nails.   - ill defined hyperpigmented plaques with some faint erythema in inguinal groin and right medial thigh  -no dystrophic nails, fissuring, or scale in feet.  - No other lesions of concern on areas examined.     Medications:  Current Outpatient Medications   Medication    amLODIPine (NORVASC) 5 MG tablet    aspirin 81 MG EC tablet    atorvastatin (LIPITOR) 20 MG tablet    blood glucose (ACCU-CHEK GUIDE) test strip    gabapentin (NEURONTIN) 300 MG capsule    glipiZIDE (GLUCOTROL XL) 10 MG 24 hr tablet    hydrochlorothiazide (HYDRODIURIL) 25 MG tablet    irbesartan (AVAPRO) 300 MG tablet    ketoconazole (NIZORAL) 2 % external cream    latanoprost (XALATAN) 0.005 % ophthalmic solution    metFORMIN (GLUCOPHAGE XR) 500 MG 24 hr tablet     No current facility-administered medications for this visit.      Past Medical History:   Patient Active Problem List   Diagnosis    Type 2 diabetes mellitus with microalbuminuria, without long-term current use of insulin (H)    Sensorineural hearing loss (SNHL) of both ears    Primary osteoarthritis involving multiple joints    Flexural eczema    Essential hypertension    Hyperlipidemia LDL goal <100     No past medical history on file.    CC Wilbert Meadows MD  [address] [place] [address] on close of this encounter.      I talked with and examined Darrel Bustos and I agree with the assessment and the plan for his groin and scrotal skin problems. . SUSAN Bonds MD.

## 2023-12-14 NOTE — PROGRESS NOTES
I talked with and examined Darrel Bustos and I agree with the assessment and the plan for his groin and scrotal skin problems. . SUSAN Bonds MD.

## 2023-12-14 NOTE — PATIENT INSTRUCTIONS
The rash in the groin does not look like a fungal infection. It looks like irritation.  Use the hydrocortisone 2.5% cream twice a day for two weeks to the area, then use it as needed if the rash recurs.    For prevention of irritation and fungal infection, you can use the miconazole powder to the area once a day after showering.

## 2024-02-05 RX ORDER — ASPIRIN 81 MG/1
81 TABLET ORAL DAILY
OUTPATIENT
Start: 2024-02-05

## 2024-02-06 NOTE — TELEPHONE ENCOUNTER
Unless patient has had a past MI or CVA (which I do not see in his record), then daily ASA not needed for primary prevention per updated guidelines. Happy to discuss at next appt if patient has questions.

## 2024-02-07 NOTE — TELEPHONE ENCOUNTER
Relayed providers response. Patient reports he takes low dose ASA for preventative purposes due to [facility] provider recommendations. Patient understands it is not current guidelines to take ASA, and is open to discussing further with provider. Patient will call back later to schedule VV to discuss.

## 2024-05-30 ENCOUNTER — IMMUNIZATION (OUTPATIENT)
Dept: INTERNAL MEDICINE | Facility: CLINIC | Age: 69
End: 2024-05-30
Payer: COMMERCIAL

## 2024-05-30 DIAGNOSIS — Z23 HIGH PRIORITY FOR 2019-NCOV VACCINE: Primary | ICD-10-CM

## 2024-05-30 PROCEDURE — 91320 SARSCV2 VAC 30MCG TRS-SUC IM: CPT

## 2024-05-30 PROCEDURE — 99207 PR NO CHARGE NURSE ONLY: CPT

## 2024-05-30 PROCEDURE — 90480 ADMN SARSCOV2 VAC 1/ONLY CMP: CPT

## 2024-07-11 DIAGNOSIS — R80.9 TYPE 2 DIABETES MELLITUS WITH MICROALBUMINURIA, WITHOUT LONG-TERM CURRENT USE OF INSULIN (H): ICD-10-CM

## 2024-07-11 DIAGNOSIS — E11.29 TYPE 2 DIABETES MELLITUS WITH MICROALBUMINURIA, WITHOUT LONG-TERM CURRENT USE OF INSULIN (H): ICD-10-CM

## 2024-07-12 RX ORDER — GLIPIZIDE 10 MG/1
10 TABLET, FILM COATED, EXTENDED RELEASE ORAL DAILY
Qty: 90 TABLET | Refills: 3 | OUTPATIENT
Start: 2024-07-12

## 2024-11-12 ENCOUNTER — TRANSFERRED RECORDS (OUTPATIENT)
Dept: MULTI SPECIALTY CLINIC | Facility: CLINIC | Age: 69
End: 2024-11-12

## 2024-11-12 LAB
ALBUMIN/CREATININE RATIO: 200 MG/G
CREATININE (EXTERNAL): 0.93 MG/DL (ref 0.73–1.18)
CREATININE (URINE): 240 MG/DL
GFR ESTIMATED (EXTERNAL): >60 ML/MIN/1.73M2
HBA1C MFR BLD: 6.7 %